# Patient Record
Sex: FEMALE | Race: WHITE | NOT HISPANIC OR LATINO | Employment: OTHER | URBAN - METROPOLITAN AREA
[De-identification: names, ages, dates, MRNs, and addresses within clinical notes are randomized per-mention and may not be internally consistent; named-entity substitution may affect disease eponyms.]

---

## 2017-02-22 NOTE — PATIENT DISCUSSION
DISCUSSED RISK OF RETINAL TEAR or detachment, myopic degeneration, and glaucoma. Instructed to return immediately if new floaters, flashing lights, decreased central or peripheral vision, or pain.

## 2017-10-06 ENCOUNTER — REFRACTION ONLY (OUTPATIENT)
Dept: URBAN - METROPOLITAN AREA CLINIC 43 | Facility: CLINIC | Age: 82
End: 2017-10-06

## 2017-10-06 DIAGNOSIS — H52.7: ICD-10-CM

## 2017-10-06 PROCEDURE — 92015GRNC REFRACTION GLASSES RECHECK - NO CHARGE

## 2017-10-06 ASSESSMENT — VISUAL ACUITY
OD_CC: J1
OS_CC: 20/25-2
OD_CC: 20/40+2
OS_CC: J1
OU_CC: J1
OU_CC: 20/20-1

## 2018-12-27 ENCOUNTER — ESTABLISHED COMPREHENSIVE EXAM (OUTPATIENT)
Dept: URBAN - METROPOLITAN AREA CLINIC 43 | Facility: CLINIC | Age: 83
End: 2018-12-27

## 2018-12-27 DIAGNOSIS — H43.813: ICD-10-CM

## 2018-12-27 DIAGNOSIS — Z96.1: ICD-10-CM

## 2018-12-27 DIAGNOSIS — H04.123: ICD-10-CM

## 2018-12-27 PROCEDURE — G9903 PT SCRN TBCO ID AS NON USER: HCPCS

## 2018-12-27 PROCEDURE — G8427 DOCREV CUR MEDS BY ELIG CLIN: HCPCS

## 2018-12-27 PROCEDURE — 1036F TOBACCO NON-USER: CPT

## 2018-12-27 PROCEDURE — 92015 DETERMINE REFRACTIVE STATE: CPT

## 2018-12-27 PROCEDURE — 92014 COMPRE OPH EXAM EST PT 1/>: CPT

## 2018-12-27 PROCEDURE — G8785 BP SCRN NO PERF AT INTERVAL: HCPCS

## 2018-12-27 ASSESSMENT — VISUAL ACUITY
OS_CC: J3-
OS_SC: J8
OD_SC: J8
OD_SC: 20/30-2
OS_SC: 20/70
OD_CC: J2-

## 2018-12-27 ASSESSMENT — TONOMETRY
OD_IOP_MMHG: 13
OS_IOP_MMHG: 12

## 2019-09-27 ENCOUNTER — ESTABLISHED COMPREHENSIVE EXAM (OUTPATIENT)
Dept: URBAN - METROPOLITAN AREA CLINIC 43 | Facility: CLINIC | Age: 84
End: 2019-09-27

## 2019-09-27 DIAGNOSIS — Z96.1: ICD-10-CM

## 2019-09-27 DIAGNOSIS — H43.813: ICD-10-CM

## 2019-09-27 DIAGNOSIS — H04.123: ICD-10-CM

## 2019-09-27 PROCEDURE — 92014 COMPRE OPH EXAM EST PT 1/>: CPT

## 2019-09-27 PROCEDURE — 92015 DETERMINE REFRACTIVE STATE: CPT

## 2019-09-27 ASSESSMENT — VISUAL ACUITY
OD_SC: 20/30
OS_CC: J1
OS_SC: J6
OS_SC: 20/70
OD_SC: J4
OD_CC: J1
OS_PH: 20/30-1

## 2019-09-27 ASSESSMENT — TONOMETRY
OS_IOP_MMHG: 13
OD_IOP_MMHG: 14

## 2020-09-02 ENCOUNTER — ESTABLISHED COMPREHENSIVE EXAM (OUTPATIENT)
Dept: URBAN - METROPOLITAN AREA CLINIC 43 | Facility: CLINIC | Age: 85
End: 2020-09-02

## 2020-09-02 DIAGNOSIS — H35.3131: ICD-10-CM

## 2020-09-02 DIAGNOSIS — H04.123: ICD-10-CM

## 2020-09-02 DIAGNOSIS — H43.813: ICD-10-CM

## 2020-09-02 DIAGNOSIS — H52.203: ICD-10-CM

## 2020-09-02 PROCEDURE — 92014 COMPRE OPH EXAM EST PT 1/>: CPT

## 2020-09-02 PROCEDURE — 92015 DETERMINE REFRACTIVE STATE: CPT

## 2020-09-02 ASSESSMENT — VISUAL ACUITY
OS_SC: 20/100
OS_SC: J10
OD_SC: 20/25-2
OS_CC: 20/50+1
OS_PH: 20/40
OD_CC: J2
OD_CC: 20/20-2
OS_CC: J4
OD_SC: J10

## 2020-09-02 ASSESSMENT — TONOMETRY
OS_IOP_MMHG: 14
OD_IOP_MMHG: 14

## 2021-09-03 ENCOUNTER — ESTABLISHED COMPREHENSIVE EXAM (OUTPATIENT)
Dept: URBAN - METROPOLITAN AREA CLINIC 43 | Facility: CLINIC | Age: 86
End: 2021-09-03

## 2021-09-03 DIAGNOSIS — H52.203: ICD-10-CM

## 2021-09-03 DIAGNOSIS — H43.813: ICD-10-CM

## 2021-09-03 DIAGNOSIS — H35.3131: ICD-10-CM

## 2021-09-03 DIAGNOSIS — H04.123: ICD-10-CM

## 2021-09-03 PROCEDURE — 92015 DETERMINE REFRACTIVE STATE: CPT

## 2021-09-03 PROCEDURE — 92014 COMPRE OPH EXAM EST PT 1/>: CPT

## 2021-09-03 ASSESSMENT — VISUAL ACUITY
OS_SC: 20/100
OD_SC: 20/100
OS_SC: J10
OS_CC: J2-
OS_PH: 20/40
OD_PH: 20/40
OD_CC: J1
OD_SC: J10

## 2021-09-03 ASSESSMENT — TONOMETRY
OS_IOP_MMHG: 13
OD_IOP_MMHG: 12